# Patient Record
Sex: FEMALE | Race: WHITE | NOT HISPANIC OR LATINO | URBAN - METROPOLITAN AREA
[De-identification: names, ages, dates, MRNs, and addresses within clinical notes are randomized per-mention and may not be internally consistent; named-entity substitution may affect disease eponyms.]

---
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
## 2025-01-13 ENCOUNTER — LAB (OUTPATIENT)
Dept: LAB | Facility: LAB | Age: 20
End: 2025-01-13
Payer: COMMERCIAL

## 2025-01-13 DIAGNOSIS — L70.0 ACNE VULGARIS: ICD-10-CM

## 2025-01-13 LAB
ALT SERPL W P-5'-P-CCNC: 14 U/L (ref 7–45)
AST SERPL W P-5'-P-CCNC: 15 U/L (ref 9–39)
B-HCG SERPL-ACNC: <2 MIU/ML
BASOPHILS # BLD AUTO: 0.07 X10*3/UL (ref 0–0.1)
BASOPHILS NFR BLD AUTO: 1 %
CHOLEST SERPL-MCNC: 152 MG/DL (ref 0–199)
CHOLESTEROL/HDL RATIO: 2.9
EOSINOPHIL # BLD AUTO: 0.2 X10*3/UL (ref 0–0.7)
EOSINOPHIL NFR BLD AUTO: 3 %
ERYTHROCYTE [DISTWIDTH] IN BLOOD BY AUTOMATED COUNT: 12.7 % (ref 11.5–14.5)
HCT VFR BLD AUTO: 41.3 % (ref 36–46)
HDLC SERPL-MCNC: 53.2 MG/DL
HGB BLD-MCNC: 13.6 G/DL (ref 12–16)
IMM GRANULOCYTES # BLD AUTO: 0.01 X10*3/UL (ref 0–0.7)
IMM GRANULOCYTES NFR BLD AUTO: 0.1 % (ref 0–0.9)
LDLC SERPL CALC-MCNC: 90 MG/DL
LYMPHOCYTES # BLD AUTO: 2.38 X10*3/UL (ref 1.2–4.8)
LYMPHOCYTES NFR BLD AUTO: 35.6 %
MCH RBC QN AUTO: 30.1 PG (ref 26–34)
MCHC RBC AUTO-ENTMCNC: 32.9 G/DL (ref 32–36)
MCV RBC AUTO: 91 FL (ref 80–100)
MONOCYTES # BLD AUTO: 0.54 X10*3/UL (ref 0.1–1)
MONOCYTES NFR BLD AUTO: 8.1 %
NEUTROPHILS # BLD AUTO: 3.48 X10*3/UL (ref 1.2–7.7)
NEUTROPHILS NFR BLD AUTO: 52.2 %
NON HDL CHOLESTEROL: 99 MG/DL (ref 0–119)
NRBC BLD-RTO: 0 /100 WBCS (ref 0–0)
PLATELET # BLD AUTO: 291 X10*3/UL (ref 150–450)
RBC # BLD AUTO: 4.52 X10*6/UL (ref 4–5.2)
TRIGL SERPL-MCNC: 45 MG/DL (ref 0–89)
VLDL: 9 MG/DL (ref 0–40)
WBC # BLD AUTO: 6.7 X10*3/UL (ref 4.4–11.3)

## 2025-01-13 PROCEDURE — 84450 TRANSFERASE (AST) (SGOT): CPT

## 2025-01-13 PROCEDURE — 84460 ALANINE AMINO (ALT) (SGPT): CPT

## 2025-01-13 PROCEDURE — 83721 ASSAY OF BLOOD LIPOPROTEIN: CPT

## 2025-01-13 PROCEDURE — 80061 LIPID PANEL: CPT

## 2025-01-13 PROCEDURE — 85025 COMPLETE CBC W/AUTO DIFF WBC: CPT

## 2025-01-13 PROCEDURE — 84702 CHORIONIC GONADOTROPIN TEST: CPT

## 2025-01-14 LAB — LDLC SERPL DIRECT ASSAY-MCNC: 91 MG/DL (ref 0–129)

## 2025-02-13 ENCOUNTER — OFFICE VISIT (OUTPATIENT)
Dept: PRIMARY CARE | Facility: EXTERNAL LOCATION | Age: 20
End: 2025-02-13
Payer: COMMERCIAL

## 2025-02-13 RX ORDER — SERTRALINE HYDROCHLORIDE 25 MG/1
TABLET, FILM COATED ORAL DAILY
COMMUNITY

## 2025-02-17 ENCOUNTER — OFFICE VISIT (OUTPATIENT)
Dept: PRIMARY CARE | Facility: EXTERNAL LOCATION | Age: 20
End: 2025-02-17

## 2025-02-17 VITALS
HEIGHT: 66 IN | DIASTOLIC BLOOD PRESSURE: 76 MMHG | TEMPERATURE: 98.1 F | BODY MASS INDEX: 26.36 KG/M2 | WEIGHT: 164 LBS | OXYGEN SATURATION: 99 % | SYSTOLIC BLOOD PRESSURE: 111 MMHG | HEART RATE: 80 BPM

## 2025-02-17 DIAGNOSIS — Z02.1 PHYSICAL EXAM, PRE-EMPLOYMENT: Primary | ICD-10-CM

## 2025-02-17 RX ORDER — SPIRONOLACTONE 50 MG/1
1 TABLET, FILM COATED ORAL
COMMUNITY
Start: 2025-02-04

## 2025-02-17 ASSESSMENT — ENCOUNTER SYMPTOMS
EYE PAIN: 0
SINUS PRESSURE: 0
FATIGUE: 0
APPETITE CHANGE: 0
NECK PAIN: 0
HEMATURIA: 0
CHILLS: 0
FEVER: 0
DYSURIA: 0
HEADACHES: 0
BACK PAIN: 0
PSYCHIATRIC NEGATIVE: 1
NECK STIFFNESS: 0
TREMORS: 0
COUGH: 0
MYALGIAS: 0
VOICE CHANGE: 0
DYSPHORIC MOOD: 0
JOINT SWELLING: 0
SHORTNESS OF BREATH: 0
PALPITATIONS: 0
ARTHRALGIAS: 0
COLOR CHANGE: 0
EYE REDNESS: 0
SORE THROAT: 0
SPEECH DIFFICULTY: 0
BRUISES/BLEEDS EASILY: 0
NAUSEA: 0
WHEEZING: 0
RHINORRHEA: 0
LIGHT-HEADEDNESS: 0
CONSTIPATION: 0
DIFFICULTY URINATING: 0
DIARRHEA: 0
ACTIVITY CHANGE: 0
SINUS PAIN: 0
VOMITING: 0
DIZZINESS: 0
NUMBNESS: 0
ABDOMINAL PAIN: 0

## 2025-02-17 NOTE — PROGRESS NOTES
"Subjective   Patient ID: Rosy Street is a 19 y.o. female who presents for Employment Physical (Physical for employment at ).    Pt presents for a pre-employment physical prior to starting a job at a . Tdap done 4/18/17. 2 series MMR done 5/1/06 &4/23/10. Per preemployment form, pt does not need TB test. Pt is a health young woman with no significant prior medical hx. Nonsmoker. Eats mostly home cooked meals. Walks and runs for exercise. Wears eye glasses. Has IUD. Does not have monthly periods.            Review of Systems   Constitutional:  Negative for activity change, appetite change, chills, fatigue and fever.   HENT:  Negative for congestion, ear pain, postnasal drip, rhinorrhea, sinus pressure, sinus pain, sneezing, sore throat and voice change.    Eyes:  Negative for pain, redness and visual disturbance.   Respiratory:  Negative for cough, shortness of breath and wheezing.    Cardiovascular:  Negative for chest pain, palpitations and leg swelling.   Gastrointestinal:  Negative for abdominal pain, constipation, diarrhea, nausea and vomiting.   Endocrine: Negative for cold intolerance, heat intolerance and polyuria.   Genitourinary:  Negative for decreased urine volume, difficulty urinating, dysuria and hematuria.   Musculoskeletal:  Negative for arthralgias, back pain, gait problem, joint swelling, myalgias, neck pain and neck stiffness.   Skin:  Negative for color change, pallor and rash.   Allergic/Immunologic: Negative for environmental allergies.   Neurological:  Negative for dizziness, tremors, speech difficulty, light-headedness, numbness and headaches.   Hematological:  Does not bruise/bleed easily.   Psychiatric/Behavioral: Negative.  Negative for dysphoric mood and suicidal ideas.        Objective   /76   Pulse 80   Temp 36.7 °C (98.1 °F)   Ht 1.676 m (5' 6\")   Wt 74.4 kg (164 lb)   LMP  (LMP Unknown) Comment: IUD  SpO2 99%   BMI 26.47 kg/m²     Physical Exam  Vitals " and nursing note reviewed.   Constitutional:       General: She is not in acute distress.     Appearance: Normal appearance.   HENT:      Head: Normocephalic.      Right Ear: Tympanic membrane, ear canal and external ear normal. There is no impacted cerumen.      Left Ear: Tympanic membrane, ear canal and external ear normal. There is no impacted cerumen.      Nose: Nose normal. No congestion or rhinorrhea.      Mouth/Throat:      Mouth: Mucous membranes are moist.      Pharynx: Oropharynx is clear. No oropharyngeal exudate or posterior oropharyngeal erythema.   Eyes:      Extraocular Movements: Extraocular movements intact.      Conjunctiva/sclera: Conjunctivae normal.      Pupils: Pupils are equal, round, and reactive to light.   Neck:      Vascular: No carotid bruit.   Cardiovascular:      Rate and Rhythm: Normal rate and regular rhythm.      Heart sounds: No murmur heard.  Pulmonary:      Effort: Pulmonary effort is normal. No respiratory distress.      Breath sounds: Normal breath sounds. No wheezing.   Abdominal:      General: Abdomen is flat. Bowel sounds are normal. There is no distension.      Palpations: Abdomen is soft. There is no mass.      Tenderness: There is no abdominal tenderness. There is no right CVA tenderness, left CVA tenderness, guarding or rebound.   Musculoskeletal:      Cervical back: Normal range of motion and neck supple. No rigidity or tenderness.      Right lower leg: No edema.      Left lower leg: No edema.   Lymphadenopathy:      Cervical: No cervical adenopathy.   Skin:     General: Skin is warm and dry.      Capillary Refill: Capillary refill takes less than 2 seconds.   Neurological:      General: No focal deficit present.      Mental Status: She is alert.      Sensory: No sensory deficit.      Coordination: Coordination normal.      Gait: Gait normal.   Psychiatric:         Mood and Affect: Mood normal.         Behavior: Behavior normal.         Thought Content: Thought content  normal.         Judgment: Judgment normal.       Assessment/Plan   Diagnoses and all orders for this visit:  Physical exam, pre-employment  -Generally physically fit for employment.  No job description available.  Follow up with your PCP  for annual preventative exams and as needed.

## 2025-03-06 ENCOUNTER — OFFICE VISIT (OUTPATIENT)
Dept: PRIMARY CARE | Facility: EXTERNAL LOCATION | Age: 20
End: 2025-03-06

## 2025-03-06 VITALS
OXYGEN SATURATION: 99 % | SYSTOLIC BLOOD PRESSURE: 133 MMHG | HEART RATE: 105 BPM | DIASTOLIC BLOOD PRESSURE: 78 MMHG | TEMPERATURE: 98.2 F

## 2025-03-06 DIAGNOSIS — N30.00 ACUTE CYSTITIS WITHOUT HEMATURIA: Primary | ICD-10-CM

## 2025-03-06 PROBLEM — N94.6 DYSMENORRHEA: Status: ACTIVE | Noted: 2025-03-06

## 2025-03-06 PROBLEM — L27.0 DRUG ERUPTION: Status: ACTIVE | Noted: 2025-03-06

## 2025-03-06 LAB
POC APPEARANCE, URINE: CLEAR
POC BILIRUBIN, URINE: NEGATIVE
POC BLOOD, URINE: ABNORMAL
POC COLOR, URINE: YELLOW
POC GLUCOSE, URINE: NEGATIVE MG/DL
POC KETONES, URINE: NEGATIVE MG/DL
POC LEUKOCYTES, URINE: ABNORMAL
POC NITRITE,URINE: NEGATIVE
POC PH, URINE: 7 PH
POC PROTEIN, URINE: NEGATIVE MG/DL
POC SPECIFIC GRAVITY, URINE: 1.02
POC UROBILINOGEN, URINE: 0.2 EU/DL

## 2025-03-06 RX ORDER — NITROFURANTOIN 25; 75 MG/1; MG/1
100 CAPSULE ORAL 2 TIMES DAILY
Qty: 10 CAPSULE | Refills: 0 | Status: SHIPPED | OUTPATIENT
Start: 2025-03-06 | End: 2025-03-11

## 2025-03-06 ASSESSMENT — ENCOUNTER SYMPTOMS
COUGH: 0
FLANK PAIN: 1
SINUS PRESSURE: 0
POLYPHAGIA: 0
DIARRHEA: 0
NAUSEA: 0
ACTIVITY CHANGE: 1
MYALGIAS: 1
APPETITE CHANGE: 0
FREQUENCY: 0
ABDOMINAL PAIN: 1
CHILLS: 1
VOMITING: 0
FEVER: 0
ABDOMINAL DISTENTION: 0
DYSURIA: 1
DIFFICULTY URINATING: 0
BACK PAIN: 1
FATIGUE: 1
SORE THROAT: 0
HEADACHES: 0
HEMATURIA: 0

## 2025-03-06 NOTE — PROGRESS NOTES
Subjective   Patient ID: Rosy Street is a 19 y.o. female who presents for UTI and Generalized Body Aches.    Pt presents with c/o body aches and chills. IH UA + leuks and blood. Urinary odor. Pelvic cramping.     UTI   This is a new problem. Associated symptoms include chills, flank pain and urgency. Pertinent negatives include no frequency, hematuria, nausea or vomiting.        Review of Systems   Constitutional:  Positive for activity change, chills and fatigue. Negative for appetite change and fever.   HENT:  Negative for congestion, ear pain, sinus pressure and sore throat.    Respiratory:  Negative for cough.    Cardiovascular:  Negative for chest pain.   Gastrointestinal:  Positive for abdominal pain (pelvic cramping). Negative for abdominal distention, diarrhea, nausea and vomiting.   Endocrine: Negative for polyphagia and polyuria.   Genitourinary:  Positive for dysuria, flank pain, pelvic pain and urgency. Negative for decreased urine volume, difficulty urinating, frequency, genital sores, hematuria, menstrual problem, vaginal bleeding and vaginal discharge.   Musculoskeletal:  Positive for back pain and myalgias.   Neurological:  Negative for headaches.       Objective   /78   Pulse 105   Temp 36.8 °C (98.2 °F)   LMP  (LMP Unknown) Comment: IUD  SpO2 99%     Physical Exam  Vitals and nursing note reviewed.   Constitutional:       General: She is not in acute distress.     Appearance: Normal appearance.   HENT:      Head: Normocephalic.   Eyes:      Conjunctiva/sclera: Conjunctivae normal.   Cardiovascular:      Rate and Rhythm: Normal rate and regular rhythm.      Heart sounds: No murmur heard.  Pulmonary:      Effort: Pulmonary effort is normal. No respiratory distress.      Breath sounds: Normal breath sounds. No stridor. No wheezing, rhonchi or rales.   Lymphadenopathy:      Cervical: No cervical adenopathy.   Skin:     General: Skin is warm and dry.   Neurological:      General: No  focal deficit present.      Mental Status: She is alert. Mental status is at baseline.   Psychiatric:         Mood and Affect: Mood normal.         Behavior: Behavior normal.         Thought Content: Thought content normal.         Judgment: Judgment normal.         Assessment/Plan   Diagnoses and all orders for this visit:  Acute cystitis without hematuria  -     POCT UA (nonautomated) manually resulted  -     Urine Culture  -     nitrofurantoin, macrocrystal-monohydrate, (Macrobid) 100 mg capsule; Take 1 capsule (100 mg) by mouth 2 times a day for 5 days.    Acute.  Urine dipstick: +leukocytes  Urine sent for C & S, will call with results and adjust treatment accordingly.  Start ATB - please complete full course unless you hear otherwise from our office.  Discussed indication for antibiotic use, administration instructions, and adverse effects with patient.  Increase your fluid intake, Tylenol or Motrin as needed for pain or fever.  May use OTC AZO/UROSTAT/Cystex for symptoms relief if desired.   Discussed hygiene for prevention.  Call our office to report and new fever/chills or back pain.  Follow up in 1 week if symptoms persist.

## 2025-03-09 LAB — BACTERIA UR CULT: ABNORMAL

## 2025-05-01 ENCOUNTER — OFFICE VISIT (OUTPATIENT)
Dept: PRIMARY CARE | Facility: CLINIC | Age: 20
End: 2025-05-01
Payer: COMMERCIAL

## 2025-05-01 VITALS
DIASTOLIC BLOOD PRESSURE: 70 MMHG | OXYGEN SATURATION: 98 % | HEIGHT: 66 IN | HEART RATE: 87 BPM | BODY MASS INDEX: 25.39 KG/M2 | WEIGHT: 158 LBS | SYSTOLIC BLOOD PRESSURE: 102 MMHG | TEMPERATURE: 97.3 F

## 2025-05-01 DIAGNOSIS — Z00.00 ANNUAL PHYSICAL EXAM: Primary | ICD-10-CM

## 2025-05-01 DIAGNOSIS — F41.1 GAD (GENERALIZED ANXIETY DISORDER): ICD-10-CM

## 2025-05-01 PROBLEM — L27.0 DRUG ERUPTION: Status: RESOLVED | Noted: 2025-03-06 | Resolved: 2025-05-01

## 2025-05-01 PROBLEM — N94.6 DYSMENORRHEA: Status: RESOLVED | Noted: 2025-03-06 | Resolved: 2025-05-01

## 2025-05-01 PROCEDURE — 99385 PREV VISIT NEW AGE 18-39: CPT | Performed by: FAMILY MEDICINE

## 2025-05-01 PROCEDURE — 1036F TOBACCO NON-USER: CPT | Performed by: FAMILY MEDICINE

## 2025-05-01 PROCEDURE — 3008F BODY MASS INDEX DOCD: CPT | Performed by: FAMILY MEDICINE

## 2025-05-01 RX ORDER — SERTRALINE HYDROCHLORIDE 50 MG/1
50 TABLET, FILM COATED ORAL DAILY
Qty: 90 TABLET | Refills: 1 | Status: SHIPPED | OUTPATIENT
Start: 2025-05-01 | End: 2026-05-01

## 2025-05-01 RX ORDER — LEVONORGESTREL 52 MG/1
1 INTRAUTERINE DEVICE INTRAUTERINE ONCE
COMMUNITY

## 2025-05-01 ASSESSMENT — PATIENT HEALTH QUESTIONNAIRE - PHQ9
SUM OF ALL RESPONSES TO PHQ9 QUESTIONS 1 AND 2: 0
2. FEELING DOWN, DEPRESSED OR HOPELESS: NOT AT ALL
1. LITTLE INTEREST OR PLEASURE IN DOING THINGS: NOT AT ALL

## 2025-05-01 ASSESSMENT — LIFESTYLE VARIABLES
SKIP TO QUESTIONS 9-10: 1
HOW MANY STANDARD DRINKS CONTAINING ALCOHOL DO YOU HAVE ON A TYPICAL DAY: 1 OR 2
HOW OFTEN DO YOU HAVE SIX OR MORE DRINKS ON ONE OCCASION: NEVER
HOW OFTEN DO YOU HAVE A DRINK CONTAINING ALCOHOL: MONTHLY OR LESS
AUDIT-C TOTAL SCORE: 1

## 2025-05-01 ASSESSMENT — PAIN SCALES - GENERAL: PAINLEVEL_OUTOF10: 0-NO PAIN

## 2025-05-01 NOTE — PROGRESS NOTES
"History Of Present Illness  Rosy Street is a 20 y.o. female presenting for Annual Exam  .    HPI   Here to establish care.  Vaccines are UTD. Healthy eating and daily walking have helped her to lose 30 lbs.     She reports NATASHA. Whole life has been anxious.  Was going to [employer] in the past year.  Prescribed sertraline 25mg. Wasn't effective so she increased to 50mg and felt better with reduction in panic attacks. Will be changing jobs soon.     Has IUD placed 12/2023. No periods.    Past Medical History  There are no active problems to display for this patient.       Medications  Medications ordered prior to the current encounter[1]     Surgical History  She has no past surgical history on file.     Social History  She reports that she has never smoked. She has never used smokeless tobacco. She reports current alcohol use.  Drug: Marijuana.    Works in .   Lives at home    Family History  Family History[2]     Allergies  Sulfamethoxazole, Cefdinir, Doxycycline, Penicillins, and Sulfamethoxazole-trimethoprim    Immunizations  Immunization History   Administered Date(s) Administered    COVID-19, mRNA, LNP-S, PF, 30 mcg/0.3 mL dose 08/11/2021, 09/01/2021    HPV 9-valent vaccine (GARDASIL 9) 04/18/2017, 04/18/2018    Tdap vaccine, age 7 year and older (BOOSTRIX, ADACEL) 04/18/2017        ROS  Negative, except as discussed in HPI     Vitals  /70   Pulse 87   Temp 36.3 °C (97.3 °F)   Ht 1.676 m (5' 6\")   Wt 71.7 kg (158 lb)   LMP 04/29/2025 (Exact Date)   SpO2 98%   BMI 25.50 kg/m²      Physical Exam  Vitals and nursing note reviewed.   Constitutional:       Appearance: Normal appearance.   HENT:      Head: Normocephalic.      Right Ear: Tympanic membrane normal.      Left Ear: Tympanic membrane normal.      Nose: Nose normal.      Mouth/Throat:      Mouth: Mucous membranes are moist.   Eyes:      Extraocular Movements: Extraocular movements intact.      Conjunctiva/sclera: Conjunctivae " normal.      Pupils: Pupils are equal, round, and reactive to light.   Cardiovascular:      Rate and Rhythm: Normal rate and regular rhythm.      Heart sounds: Normal heart sounds.   Pulmonary:      Effort: Pulmonary effort is normal. No respiratory distress.      Breath sounds: Normal breath sounds.   Abdominal:      General: Abdomen is flat.      Palpations: Abdomen is soft.      Tenderness: There is no abdominal tenderness.   Musculoskeletal:      Cervical back: Neck supple.   Lymphadenopathy:      Cervical: No cervical adenopathy.   Skin:     General: Skin is warm and dry.      Findings: No rash.   Neurological:      General: No focal deficit present.      Mental Status: She is alert. Mental status is at baseline.      Coordination: Coordination normal.      Gait: Gait normal.      Deep Tendon Reflexes: Reflexes normal.   Psychiatric:         Mood and Affect: Mood normal.         Behavior: Behavior normal.         Relevant Results  Lab Results   Component Value Date    WBC 6.7 01/13/2025    WBC 5.2 12/10/2024    HGB 13.6 01/13/2025    HGB 13.4 12/10/2024    HCT 41.3 01/13/2025    HCT 41.0 12/10/2024    MCV 91 01/13/2025    MCV 93 12/10/2024     01/13/2025     12/10/2024     Lab Results   Component Value Date     06/10/2020    K 4.2 07/01/2024    K 4.7 06/10/2020     06/10/2020    CO2 22 (L) 06/10/2020    BUN 12 06/10/2020    CREATININE 0.7 06/10/2020    CALCIUM 9.7 06/10/2020    PROT 7.6 06/10/2020    BILITOT 0.8 06/10/2020    ALKPHOS 94 06/10/2020    ALT 14 01/13/2025    ALT 16 12/10/2024    AST 15 01/13/2025    AST 18 12/10/2024    GLUCOSE 90 06/10/2020     Lab Results   Component Value Date    HGBA1C 4.6 06/10/2020     Lab Results   Component Value Date    TSH 0.83 06/10/2020      Lab Results   Component Value Date    CHOL 152 01/13/2025    TRIG 45 01/13/2025    HDL 53.2 01/13/2025    LDLDIRECT 91 01/13/2025           Assessment/Plan   Rosy was seen today for annual  exam.  Diagnoses and all orders for this visit:  Annual physical exam (Primary)  -     Comprehensive Metabolic Panel; Future  -     CBC; Future  -     Vitamin D 25-Hydroxy,Total (for eval of Vitamin D levels); Future  -     TSH with reflex to Free T4 if abnormal; Future  -     Comprehensive Metabolic Panel  -     CBC  -     Vitamin D 25-Hydroxy,Total (for eval of Vitamin D levels)  -     TSH with reflex to Free T4 if abnormal  NATASHA (generalized anxiety disorder)  -     sertraline (Zoloft) 50 mg tablet; Take 1 tablet (50 mg) by mouth once daily.         Counseling:   Medication education:   -Education:  The patient is counseled regarding potential side-effects of any and all new medications  -Understanding:  Patient expressed understanding of information discussed today  -Adherence:  No barriers to adherence identified    Final treatment plan is a result of shared decision making with patient.         Javier Padilla MD        [1]   Current Outpatient Medications   Medication Sig Dispense Refill    levonorgestreL (Liletta) 20.1 mcg/24 hr intrauterine device 52 mg by intrauterine route 1 time.      sertraline (Zoloft) 50 mg tablet Take 1 tablet (50 mg) by mouth once daily. 90 tablet 1     No current facility-administered medications for this visit.   [2]   Family History  Problem Relation Name Age of Onset    No Known Problems Mother      No Known Problems Father

## 2025-05-01 NOTE — PATIENT INSTRUCTIONS
Anxiety  -Restart sertraline half tablet for 2 weeks, then increase to 1 tablet daily.     Get labs done at your convenience (does not need to be fasting)

## 2025-05-22 ENCOUNTER — OFFICE VISIT (OUTPATIENT)
Dept: PRIMARY CARE | Facility: EXTERNAL LOCATION | Age: 20
End: 2025-05-22
Payer: COMMERCIAL

## 2025-05-22 VITALS
TEMPERATURE: 97.9 F | HEART RATE: 90 BPM | DIASTOLIC BLOOD PRESSURE: 79 MMHG | SYSTOLIC BLOOD PRESSURE: 119 MMHG | OXYGEN SATURATION: 100 %

## 2025-05-22 DIAGNOSIS — L01.00 IMPETIGO: ICD-10-CM

## 2025-05-22 DIAGNOSIS — M54.50 CHRONIC BILATERAL LOW BACK PAIN WITHOUT SCIATICA: Primary | ICD-10-CM

## 2025-05-22 DIAGNOSIS — G89.29 CHRONIC BILATERAL LOW BACK PAIN WITHOUT SCIATICA: Primary | ICD-10-CM

## 2025-05-22 RX ORDER — SPIRONOLACTONE 50 MG/1
TABLET, FILM COATED ORAL
COMMUNITY
Start: 2025-05-11

## 2025-05-22 RX ORDER — MUPIROCIN CALCIUM 20 MG/G
CREAM TOPICAL 3 TIMES DAILY
Qty: 15 G | Refills: 0 | Status: SHIPPED | OUTPATIENT
Start: 2025-05-22 | End: 2025-06-01

## 2025-05-22 RX ORDER — MUPIROCIN 20 MG/G
OINTMENT TOPICAL 3 TIMES DAILY
Qty: 22 G | Refills: 0 | Status: SHIPPED | OUTPATIENT
Start: 2025-05-22 | End: 2025-06-01

## 2025-05-22 ASSESSMENT — ENCOUNTER SYMPTOMS
HEADACHES: 0
PARESTHESIAS: 0
LEG PAIN: 0
PARESIS: 0
SORE THROAT: 0
VOMITING: 0
ACTIVITY CHANGE: 0
WEAKNESS: 0
TROUBLE SWALLOWING: 0
CHILLS: 0
EYE ITCHING: 0
EYE PAIN: 0
SINUS PAIN: 0
BOWEL INCONTINENCE: 0
NAUSEA: 0
APPETITE CHANGE: 0
TINGLING: 0
RHINORRHEA: 1
DIARRHEA: 0
NUMBNESS: 0
BACK PAIN: 1
MYALGIAS: 0
FATIGUE: 0
ABDOMINAL PAIN: 0
COUGH: 0
WEIGHT LOSS: 0
FEVER: 0
SINUS PRESSURE: 0
EYE REDNESS: 0

## 2025-05-22 NOTE — LETTER
May 22, 2025     Patient: Rosy Street   YOB: 2005   Date of Visit: 5/22/2025       To Whom It May Concern:    Rosy Street was seen in my clinic on 5/22/2025 at 10:15 am. Please excuse Rosy for her absence from work on this day to make the appointment. It is my medical recommendation the patient remain out of work through 5/23/25.     If you have any questions or concerns, please don't hesitate to call.         Sincerely,         Gabby Duffy, KAVEH-CNP        CC: No Recipients

## 2025-05-22 NOTE — PROGRESS NOTES
Subjective   Patient ID: Rosy Street is a 20 y.o. female who presents for Rash (Above lip/exposure to impetigo ).    Pt presents with lesion to upper lip. Recent exposure to impetigo. Noticed lesion yesterday. Mild nasal congestion. Thinks its from the weather though. C/o mild clear drainage from wound. Denies itching. Denies fever, chills, body aches, cough, ear pain, sore throat, cough, nausea, vomiting, diarrhea. No OTC meds.     Pt also c/o chronic low back pain that spans across her lower back. Aching and at times sharp. Denies radiating pain or sciatica. Denies numbness, tingling, change in bowels or bladder habits. Has tried stretching and heat for the pain in the past. Has tried tylenol and ibuprofen with little improvement. Denies any injury that she can remember. Worried she has a slipped disc.     Rash  This is a new problem. The affected locations include the face. Associated symptoms include congestion and rhinorrhea. Pertinent negatives include no cough, diarrhea, eye pain, fatigue, fever, sore throat or vomiting.   Back Pain  This is a chronic problem. The current episode started more than 1 year ago. The problem is unchanged. The pain is present in the lumbar spine. The pain does not radiate. Pertinent negatives include no abdominal pain, bladder incontinence, bowel incontinence, chest pain, fever, headaches, leg pain, numbness, paresis, paresthesias, pelvic pain, tingling, weakness or weight loss. She has tried analgesics, heat, home exercises and NSAIDs for the symptoms. The treatment provided mild relief.        Review of Systems   Constitutional:  Negative for activity change, appetite change, chills, fatigue, fever and weight loss.   HENT:  Positive for congestion and rhinorrhea. Negative for ear pain, sinus pressure, sinus pain, sore throat and trouble swallowing.    Eyes:  Negative for pain, redness and itching.   Respiratory:  Negative for cough.    Cardiovascular:  Negative for chest  pain.   Gastrointestinal:  Negative for abdominal pain, bowel incontinence, diarrhea, nausea and vomiting.   Genitourinary:  Negative for bladder incontinence and pelvic pain.   Musculoskeletal:  Positive for back pain. Negative for myalgias.   Skin:  Positive for rash.   Neurological:  Negative for tingling, weakness, numbness, headaches and paresthesias.       Objective   /79   Pulse 90   Temp 36.6 °C (97.9 °F)   LMP 04/29/2025 (Exact Date)   SpO2 100%     Physical Exam  Vitals and nursing note reviewed.   Constitutional:       General: She is not in acute distress.  HENT:      Head: Normocephalic.      Right Ear: Tympanic membrane, ear canal and external ear normal. There is no impacted cerumen.      Left Ear: Tympanic membrane, ear canal and external ear normal. There is no impacted cerumen.      Nose: Rhinorrhea present. No congestion.      Mouth/Throat:      Pharynx: No oropharyngeal exudate or posterior oropharyngeal erythema.   Eyes:      Conjunctiva/sclera: Conjunctivae normal.   Cardiovascular:      Rate and Rhythm: Normal rate and regular rhythm.      Heart sounds: No murmur heard.  Pulmonary:      Effort: Pulmonary effort is normal. No respiratory distress.      Breath sounds: No stridor. No wheezing, rhonchi or rales.   Lymphadenopathy:      Cervical: No cervical adenopathy.   Skin:     General: Skin is warm and dry.      Findings: Rash (sincle lesion. roughly a centimeter in diameter to left upper lip. mild yellow crusting to wound. scant clear drainage.) present.   Neurological:      General: No focal deficit present.      Mental Status: She is alert. Mental status is at baseline.   Psychiatric:         Mood and Affect: Mood normal.         Behavior: Behavior normal.         Thought Content: Thought content normal.         Judgment: Judgment normal.         Assessment/Plan   Diagnoses and all orders for this visit:  Chronic bilateral low back pain without sciatica  -     XR lumbar spine 2-3  views; Future  Impetigo  -     mupirocin (Bactroban) 2 % cream; Apply topically 3 times a day for 10 days. apply to affected area  -     mupirocin (Bactroban) 2 % ointment; Apply topically 3 times a day for 10 days. apply to affected area    -start topical abx as ordered.   -avoid touching lesion  -wash hands frequently  -follow up with PCP if no improvement in 5-7 days, or sooner if s/s worsen or new s/s develop.     -XR for chronic low back pain.   -cont with gentle stretching, ibuprofen, tylenol, heat, ice prn for comfort  -go to ED for numbness/tingling, change in bowel or bladder habits, severe pain.   -follow up with PCP for further eval and plan of care.

## 2025-06-27 ENCOUNTER — OFFICE VISIT (OUTPATIENT)
Dept: URGENT CARE | Age: 20
End: 2025-06-27
Payer: COMMERCIAL

## 2025-06-27 VITALS
RESPIRATION RATE: 18 BRPM | BODY MASS INDEX: 25.82 KG/M2 | OXYGEN SATURATION: 98 % | SYSTOLIC BLOOD PRESSURE: 109 MMHG | HEART RATE: 81 BPM | DIASTOLIC BLOOD PRESSURE: 71 MMHG | WEIGHT: 160 LBS | TEMPERATURE: 97.9 F

## 2025-06-27 DIAGNOSIS — R39.15 URINARY URGENCY: Primary | ICD-10-CM

## 2025-06-27 LAB
POC APPEARANCE, URINE: CLEAR
POC BILIRUBIN, URINE: NEGATIVE
POC BLOOD, URINE: NEGATIVE
POC COLOR, URINE: YELLOW
POC GLUCOSE, URINE: NEGATIVE MG/DL
POC KETONES, URINE: NEGATIVE MG/DL
POC LEUKOCYTES, URINE: NEGATIVE
POC NITRITE,URINE: NEGATIVE
POC PH, URINE: 6 PH
POC PROTEIN, URINE: NEGATIVE MG/DL
POC SPECIFIC GRAVITY, URINE: 1.01
POC UROBILINOGEN, URINE: 0.2 EU/DL
PREGNANCY TEST URINE, POC: NEGATIVE

## 2025-06-27 NOTE — PATIENT INSTRUCTIONS
Please follow up with your primary provider within one week if symptoms do not improve.  You may schedule an appointment online at [email or web address] or call [phone]. Go to the Emergency Department if symptoms significantly worsen    We will call if you need a change in treatment based on urine culture in 2-3 days

## 2025-06-27 NOTE — PROGRESS NOTES
Subjective   Patient ID: Rosy Street is a 20 y.o. female. They present today with a chief complaint of Urinary Urgency (Burning with urination for 3 days).    History of Present Illness  Reports increased urinary urgency, burning after urination 3 days ago x1-2 days that improved after taking OTC urinary relief. States she had a UTI a few months ago. Denies abdominal or back pain, N/V, fever. LMP 2 months ago, normally irregular.          Past Medical History  Allergies as of 06/27/2025 - Reviewed 06/27/2025   Allergen Reaction Noted    Sulfamethoxazole Hives 03/05/2024    Cefdinir Rash 08/08/2021    Doxycycline Hives and Rash 08/08/2021    Penicillins Hives and Rash 07/27/2016    Sulfamethoxazole-trimethoprim Rash 03/05/2024       Prescriptions Prior to Admission[1]     Medical History[2]    Surgical History[3]     reports that she has never smoked. She has never used smokeless tobacco. She reports that she does not currently use alcohol.  Drug: Marijuana.    Review of Systems  Pertinent systems reviewed and were negative unless otherwise stated in HPI.    Objective    Vitals:    06/27/25 1359   BP: 109/71   BP Location: Left arm   Patient Position: Sitting   BP Cuff Size: Adult   Pulse: 81   Resp: 18   Temp: 36.6 °C (97.9 °F)   TempSrc: Temporal   SpO2: 98%   Weight: 72.6 kg (160 lb)     No LMP recorded. (Menstrual status: IUD).    Physical Exam  Constitutional:       General: She is not in acute distress.  Cardiovascular:      Rate and Rhythm: Normal rate and regular rhythm.      Heart sounds: Normal heart sounds.   Pulmonary:      Effort: Pulmonary effort is normal.   Abdominal:      Palpations: Abdomen is soft.      Tenderness: There is no abdominal tenderness. There is no right CVA tenderness, left CVA tenderness or guarding.   Skin:     Findings: No rash.   Psychiatric:         Mood and Affect: Mood normal.         Behavior: Behavior normal.         Diagnostic study results (if any) were reviewed by  Ramiro Solomon PA-C.    Assessment/Plan   Allergies, medications, history, and pertinent labs/EKGs/imaging reviewed by Ramiro Solomon PA-C.     Medical Decision Making  Low concern for pyelonephritis or nephrolithiasis. Symptoms consistent with UTI however dip unremarkable. Advised we will treat if culture shows infection and symptoms return.    Orders and Diagnoses  Diagnoses and all orders for this visit:  Urinary urgency  -     POCT UA Automated manually resulted  -     POCT pregnancy, urine manually resulted  -     Urine Culture    Medical Admin Record    Disposition: Home    Electronically signed by Ramiro Solomon PA-C       [1] (Not in a hospital admission)   [2]   Past Medical History:  Diagnosis Date    Anxiety     Drug eruption 03/06/2025    Dysmenorrhea 03/06/2025   [3] No past surgical history on file.

## 2025-06-29 LAB — BACTERIA UR CULT: NORMAL
